# Patient Record
Sex: FEMALE | Race: WHITE | NOT HISPANIC OR LATINO | URBAN - METROPOLITAN AREA
[De-identification: names, ages, dates, MRNs, and addresses within clinical notes are randomized per-mention and may not be internally consistent; named-entity substitution may affect disease eponyms.]

---

## 2017-01-17 ENCOUNTER — IMPORTED ENCOUNTER (OUTPATIENT)
Dept: URBAN - METROPOLITAN AREA CLINIC 31 | Facility: CLINIC | Age: 57
End: 2017-01-17

## 2017-01-17 PROCEDURE — 92014 COMPRE OPH EXAM EST PT 1/>: CPT

## 2017-01-17 PROCEDURE — V2520 CONTACT LENS HYDROPHILIC: HCPCS

## 2017-01-17 PROCEDURE — 92310 CONTACT LENS FITTING OU: CPT

## 2017-01-17 PROCEDURE — 92015 DETERMINE REFRACTIVE STATE: CPT

## 2018-02-01 ENCOUNTER — IMPORTED ENCOUNTER (OUTPATIENT)
Dept: URBAN - METROPOLITAN AREA CLINIC 31 | Facility: CLINIC | Age: 58
End: 2018-02-01

## 2018-02-01 PROBLEM — H04.123: Noted: 2018-02-01

## 2018-02-01 PROCEDURE — 92014 COMPRE OPH EXAM EST PT 1/>: CPT

## 2018-02-01 PROCEDURE — 92310 CONTACT LENS FITTING OU: CPT

## 2018-02-01 PROCEDURE — 92015 DETERMINE REFRACTIVE STATE: CPT

## 2018-02-01 NOTE — PATIENT DISCUSSION
1.  Refractive error- rx change. more reading in cls. 2. Disp trials of Moist 3.00/1.00 MONO. May need more near on OD. Fit 80. Superior 3. RTN 1 yr CE.

## 2019-04-05 ENCOUNTER — IMPORTED ENCOUNTER (OUTPATIENT)
Dept: URBAN - METROPOLITAN AREA CLINIC 31 | Facility: CLINIC | Age: 59
End: 2019-04-05

## 2019-04-05 PROBLEM — H04.123: Noted: 2019-04-05

## 2019-04-05 PROCEDURE — 92015 DETERMINE REFRACTIVE STATE: CPT

## 2019-04-05 PROCEDURE — 92310 CONTACT LENS FITTING OU: CPT

## 2019-04-05 PROCEDURE — V2520 CONTACT LENS HYDROPHILIC: HCPCS

## 2019-04-05 PROCEDURE — 92014 COMPRE OPH EXAM EST PT 1/>: CPT

## 2019-04-05 NOTE — PATIENT DISCUSSION
1.  Refractive error- rx change. more reading in cls. 2. Disp trials of Moist 3.50/1.00 MONO. Pt wants to order yrs supply now. Fit 80. Superior --gets $125 for materials. 3.  RTN 1 yr CE.

## 2020-05-18 ENCOUNTER — IMPORTED ENCOUNTER (OUTPATIENT)
Dept: URBAN - METROPOLITAN AREA CLINIC 31 | Facility: CLINIC | Age: 60
End: 2020-05-18

## 2020-05-18 PROCEDURE — 92310 CONTACT LENS FITTING OU: CPT

## 2020-05-18 PROCEDURE — 92014 COMPRE OPH EXAM EST PT 1/>: CPT

## 2020-05-18 PROCEDURE — V2520 CONTACT LENS HYDROPHILIC: HCPCS

## 2020-05-18 NOTE — PATIENT DISCUSSION
1.  Refractive error- rx change. more reading in cls. 2. Stay with current rx-- Moist 3.50/1.00 MONO. Pt wants to order yrs supply now. Fit 90. Superior --gets $125 for materials. 3.  RTN 1 yr CE.   Superior

## 2022-04-02 ASSESSMENT — TONOMETRY
OS_IOP_MMHG: 18
OD_IOP_MMHG: 18
OD_IOP_MMHG: 14
OS_IOP_MMHG: 17
OS_IOP_MMHG: 13
OD_IOP_MMHG: 15
OS_IOP_MMHG: 13
OD_IOP_MMHG: 16